# Patient Record
Sex: MALE | Race: WHITE | Employment: FULL TIME | URBAN - NONMETROPOLITAN AREA
[De-identification: names, ages, dates, MRNs, and addresses within clinical notes are randomized per-mention and may not be internally consistent; named-entity substitution may affect disease eponyms.]

---

## 2020-07-13 ENCOUNTER — OFFICE VISIT (OUTPATIENT)
Dept: FAMILY MEDICINE CLINIC | Age: 30
End: 2020-07-13
Payer: COMMERCIAL

## 2020-07-13 VITALS
WEIGHT: 303 LBS | HEIGHT: 72 IN | OXYGEN SATURATION: 98 % | BODY MASS INDEX: 41.04 KG/M2 | SYSTOLIC BLOOD PRESSURE: 132 MMHG | DIASTOLIC BLOOD PRESSURE: 78 MMHG | HEART RATE: 88 BPM | TEMPERATURE: 97.6 F

## 2020-07-13 PROCEDURE — G8417 CALC BMI ABV UP PARAM F/U: HCPCS | Performed by: FAMILY MEDICINE

## 2020-07-13 PROCEDURE — 99214 OFFICE O/P EST MOD 30 MIN: CPT

## 2020-07-13 PROCEDURE — 99203 OFFICE O/P NEW LOW 30 MIN: CPT

## 2020-07-13 PROCEDURE — G8427 DOCREV CUR MEDS BY ELIG CLIN: HCPCS | Performed by: FAMILY MEDICINE

## 2020-07-13 PROCEDURE — 4004F PT TOBACCO SCREEN RCVD TLK: CPT | Performed by: FAMILY MEDICINE

## 2020-07-13 PROCEDURE — 99204 OFFICE O/P NEW MOD 45 MIN: CPT | Performed by: FAMILY MEDICINE

## 2020-07-13 ASSESSMENT — PATIENT HEALTH QUESTIONNAIRE - PHQ9
1. LITTLE INTEREST OR PLEASURE IN DOING THINGS: 0
SUM OF ALL RESPONSES TO PHQ QUESTIONS 1-9: 0
2. FEELING DOWN, DEPRESSED OR HOPELESS: 0
SUM OF ALL RESPONSES TO PHQ QUESTIONS 1-9: 0
SUM OF ALL RESPONSES TO PHQ9 QUESTIONS 1 & 2: 0

## 2020-07-13 NOTE — LETTER
[facility] department of [place] [address]  Phone: [phone]  Fax: [phone]    Salvatore Vega MD        July 13, 2020     Patient: Lloyd Carrasco   YOB: 1990   Date of Visit: 7/13/2020       To Whom It May Concern: It is my medical opinion that Earl Reyes should remain out of work until 7/23/2020. please excuse due to illness from 7/1/2020-7/23/2020. .    If you have any questions or concerns, please don't hesitate to call.     Sincerely,        Salvatore Vega MD

## 2020-07-13 NOTE — PROGRESS NOTES
HPI:  Patient comes in today for   Chief Complaint   Patient presents with   Kassy Lewis New Doctor    Letter for School/Work     is scheduled to see surgeon on 7/23 [facility]   Here to get established. Recently was hospitalized at [address] for incarcerated abdominal hernia,was treated non surgically. h/o ventral hernia with recurrence,has had surgery in past for bowel obstruction and also has had abdominal wall hernia repair which has failed,is scheduled to go to [address] [place] [address] for ventral hernia repair. Is trying to loose weight. Needs off work note for his upcoming surgery and has had multiple recurrence of his hernia needing him to go the ER frequently this past month and  missing work. HISTORY:  No past medical history on file.     Past Surgical History:   Procedure Laterality Date    FRACTURE SURGERY Left 2008    tibia fibula surgery    OTHER SURGICAL HISTORY  01/27/2016    exploration of abdomen, repair of liver and bowel        Family History   Problem Relation Age of Onset    Diabetes Mother        Social History     Socioeconomic History    Marital status: Single     Spouse name: Not on file    Number of children: Not on file    Years of education: Not on file    Highest education level: Not on file   Occupational History    Not on file   Social Needs    Financial resource strain: Not on file    Food insecurity     Worry: Not on file     Inability: Not on file    Transportation needs     Medical: Not on file     Non-medical: Not on file   Tobacco Use    Smoking status: Former Smoker   Substance and Sexual Activity    Alcohol use: Not on file    Drug use: Not on file    Sexual activity: Not on file   Lifestyle    Physical activity     Days per week: Not on file     Minutes per session: Not on file    Stress: Not on file   Relationships    Social connections     Talks on phone: Not on file     Gets together: Not on file     Attends Gnosticism service: Not on file     Active member of club or organization: Not on file     Attends meetings of clubs or organizations: Not on file     Relationship status: Not on file    Intimate partner violence     Fear of current or ex partner: Not on file     Emotionally abused: Not on file     Physically abused: Not on file     Forced sexual activity: Not on file   Other Topics Concern    Not on file   Social History Narrative    Not on file       No current outpatient medications on file. No current facility-administered medications for this visit. Allergies   Allergen Reactions    Hydrocodone Nausea And Vomiting    Hydrocodone-Acetaminophen Nausea And Vomiting    Morphine Nausea And Vomiting and Other (See Comments)     Dizziness and states \"makes me mean\"       REVIEW OF SYSTEMS:  General: No fevers, chills, change in weight  HEENT: No double vision, blurry vision, runny nose, sore throat, tinnitus  Cardio: No chest pain, palpitations, JOY, edema, PND  Pulmonary: No cough, hemoptysis, SOB  GI: No nausea, vomiting, dysphagia, odynophagia, diarrhea, constipation. : No dysuria, hematuria, urgency, incontinence  Musculoskeletal: No muscle or joint aches, no joint swelling  Neuro: No dizziness/lightheadedness, no seizures  Endocrine: No polyuria, polydipsia, polyphagia, no temperature intolerance  Skin: No lesions or itching  No problems with ADLs  Sleep: good  Psychiatric: No depression or anxiety. PHYSICAL EXAM:  VS:  /78   Pulse 88   Temp 97.6 °F (36.4 °C)   Ht 6' (1.829 m)   Wt (!) 303 lb (137.4 kg)   SpO2 98%   BMI 41.09 kg/m²   General:  Alert and oriented, NAD  HEENT:  TMs, JUAN, EOMI, Conjunctivae clear       Throat currently clear.   NECK:  Supple without adenopathy or thyromegaly, no carotid bruits  LUNGS:  CTA all fields  HEART:  RRR without M, R, or G  ABDOMEN:  Soft and nontender without palpable abnormalities  EXTREMITIES:  Without clubbing, cyanosis, or edema, no calf tenderness  NEURO:  No focal deficits. SKIN:  warm to touch,normal texture. No active lesions. ASSESSMENT/PLAN:     Diagnosis Orders   1. Ventral hernia without obstruction or gangrene     2. Morbid obesity with body mass index (BMI) of 40.0 to 44.9 in adult [facility])         No orders of the defined types were placed in this encounter. Requested Prescriptions      No prescriptions requested or ordered in this encounter   F/u with [facility]. Off work till seen by surgery at [address] [place] [address]. Advised to continue with weight loss. Return if symptoms worsen or fail to improve.     Electronically signed by Maribel Allen MD

## 2023-01-10 ENCOUNTER — HOSPITAL ENCOUNTER (EMERGENCY)
Age: 33
Discharge: HOME | End: 2023-01-10
Attending: EMERGENCY MEDICINE
Payer: COMMERCIAL

## 2023-01-10 ENCOUNTER — APPOINTMENT (OUTPATIENT)
Dept: CT IMAGING | Age: 33
End: 2023-01-10
Payer: COMMERCIAL

## 2023-01-10 VITALS
WEIGHT: 305 LBS | BODY MASS INDEX: 41.31 KG/M2 | TEMPERATURE: 97.1 F | OXYGEN SATURATION: 95 % | DIASTOLIC BLOOD PRESSURE: 90 MMHG | RESPIRATION RATE: 14 BRPM | HEART RATE: 59 BPM | SYSTOLIC BLOOD PRESSURE: 134 MMHG | HEIGHT: 72 IN

## 2023-01-10 DIAGNOSIS — K43.9 VENTRAL HERNIA WITHOUT OBSTRUCTION OR GANGRENE: Primary | ICD-10-CM

## 2023-01-10 LAB
ABSOLUTE EOS #: 0.08 K/UL (ref 0–0.44)
ABSOLUTE IMMATURE GRANULOCYTE: 0.03 K/UL (ref 0–0.3)
ABSOLUTE LYMPH #: 3.57 K/UL (ref 1.1–3.7)
ABSOLUTE MONO #: 0.76 K/UL (ref 0.1–1.2)
ALBUMIN SERPL-MCNC: 4.5 G/DL (ref 3.5–5.2)
ALBUMIN/GLOBULIN RATIO: 1.6 (ref 1–2.5)
ALP BLD-CCNC: 80 U/L (ref 40–129)
ALT SERPL-CCNC: 22 U/L (ref 5–41)
AMYLASE: 52 U/L (ref 28–100)
ANION GAP SERPL CALCULATED.3IONS-SCNC: 11 MMOL/L (ref 9–17)
AST SERPL-CCNC: 18 U/L
BACTERIA: ABNORMAL
BASOPHILS # BLD: 0 % (ref 0–2)
BASOPHILS ABSOLUTE: 0.04 K/UL (ref 0–0.2)
BILIRUB SERPL-MCNC: 0.3 MG/DL (ref 0.3–1.2)
BILIRUBIN DIRECT: 0.1 MG/DL
BILIRUBIN URINE: NEGATIVE
BILIRUBIN, INDIRECT: 0.2 MG/DL (ref 0–1)
BUN BLDV-MCNC: 18 MG/DL (ref 6–20)
CALCIUM SERPL-MCNC: 9.5 MG/DL (ref 8.6–10.4)
CHLORIDE BLD-SCNC: 103 MMOL/L (ref 98–107)
CO2: 24 MMOL/L (ref 20–31)
CREAT SERPL-MCNC: 0.82 MG/DL (ref 0.7–1.2)
EOSINOPHILS RELATIVE PERCENT: 1 % (ref 1–4)
EPITHELIAL CELLS UA: ABNORMAL /HPF (ref 0–5)
GFR SERPL CREATININE-BSD FRML MDRD: >60 ML/MIN/1.73M2
GLUCOSE BLD-MCNC: 87 MG/DL (ref 70–99)
GLUCOSE URINE: NEGATIVE
HCT VFR BLD CALC: 45.8 % (ref 40.7–50.3)
HEMOGLOBIN: 15.6 G/DL (ref 13–17)
IMMATURE GRANULOCYTES: 0 %
KETONES, URINE: NEGATIVE
LACTIC ACID: 1.3 MMOL/L (ref 0.5–2.2)
LEUKOCYTE ESTERASE, URINE: NEGATIVE
LIPASE: 18 U/L (ref 13–60)
LYMPHOCYTES # BLD: 34 % (ref 24–43)
MCH RBC QN AUTO: 29.7 PG (ref 25.2–33.5)
MCHC RBC AUTO-ENTMCNC: 34.1 G/DL (ref 25.2–33.5)
MCV RBC AUTO: 87.1 FL (ref 82.6–102.9)
MONOCYTES # BLD: 7 % (ref 3–12)
MUCUS: ABNORMAL
NITRITE, URINE: NEGATIVE
NRBC AUTOMATED: 0 PER 100 WBC
PDW BLD-RTO: 12.1 % (ref 11.8–14.4)
PH UA: 5.5 (ref 5–6)
PLATELET # BLD: 255 K/UL (ref 138–453)
PMV BLD AUTO: 10 FL (ref 8.1–13.5)
POTASSIUM SERPL-SCNC: 4.2 MMOL/L (ref 3.7–5.3)
PROTEIN UA: NEGATIVE
RBC # BLD: 5.26 M/UL (ref 4.21–5.77)
RBC UA: ABNORMAL /HPF (ref 0–4)
SEG NEUTROPHILS: 58 % (ref 36–65)
SEGMENTED NEUTROPHILS ABSOLUTE COUNT: 6.07 K/UL (ref 1.5–8.1)
SODIUM BLD-SCNC: 138 MMOL/L (ref 135–144)
SPECIFIC GRAVITY UA: 1.03 (ref 1.01–1.02)
TOTAL PROTEIN: 7.3 G/DL (ref 6.4–8.3)
URINE HGB: NEGATIVE
UROBILINOGEN, URINE: NORMAL
WBC # BLD: 10.6 K/UL (ref 3.5–11.3)
WBC UA: ABNORMAL /HPF (ref 0–4)

## 2023-01-10 PROCEDURE — 80053 COMPREHEN METABOLIC PANEL: CPT

## 2023-01-10 PROCEDURE — 6360000002 HC RX W HCPCS: Performed by: EMERGENCY MEDICINE

## 2023-01-10 PROCEDURE — 85025 COMPLETE CBC W/AUTO DIFF WBC: CPT

## 2023-01-10 PROCEDURE — 96374 THER/PROPH/DIAG INJ IV PUSH: CPT

## 2023-01-10 PROCEDURE — 83690 ASSAY OF LIPASE: CPT

## 2023-01-10 PROCEDURE — 82248 BILIRUBIN DIRECT: CPT

## 2023-01-10 PROCEDURE — 83605 ASSAY OF LACTIC ACID: CPT

## 2023-01-10 PROCEDURE — 6360000004 HC RX CONTRAST MEDICATION: Performed by: EMERGENCY MEDICINE

## 2023-01-10 PROCEDURE — 96361 HYDRATE IV INFUSION ADD-ON: CPT

## 2023-01-10 PROCEDURE — 99285 EMERGENCY DEPT VISIT HI MDM: CPT

## 2023-01-10 PROCEDURE — 74177 CT ABD & PELVIS W/CONTRAST: CPT

## 2023-01-10 PROCEDURE — 82150 ASSAY OF AMYLASE: CPT

## 2023-01-10 PROCEDURE — 96375 TX/PRO/DX INJ NEW DRUG ADDON: CPT

## 2023-01-10 PROCEDURE — 2580000003 HC RX 258: Performed by: EMERGENCY MEDICINE

## 2023-01-10 PROCEDURE — 81001 URINALYSIS AUTO W/SCOPE: CPT

## 2023-01-10 RX ORDER — ONDANSETRON 4 MG/1
4 TABLET, FILM COATED ORAL EVERY 8 HOURS PRN
Qty: 20 TABLET | Refills: 0 | Status: SHIPPED | OUTPATIENT
Start: 2023-01-10

## 2023-01-10 RX ORDER — ONDANSETRON 2 MG/ML
4 INJECTION INTRAMUSCULAR; INTRAVENOUS ONCE
Status: COMPLETED | OUTPATIENT
Start: 2023-01-10 | End: 2023-01-10

## 2023-01-10 RX ORDER — KETOROLAC TROMETHAMINE 30 MG/ML
30 INJECTION, SOLUTION INTRAMUSCULAR; INTRAVENOUS ONCE
Status: COMPLETED | OUTPATIENT
Start: 2023-01-10 | End: 2023-01-10

## 2023-01-10 RX ORDER — 0.9 % SODIUM CHLORIDE 0.9 %
1000 INTRAVENOUS SOLUTION INTRAVENOUS ONCE
Status: COMPLETED | OUTPATIENT
Start: 2023-01-10 | End: 2023-01-10

## 2023-01-10 RX ORDER — IBUPROFEN 800 MG/1
800 TABLET ORAL EVERY 8 HOURS PRN
Qty: 30 TABLET | Refills: 0 | Status: SHIPPED | OUTPATIENT
Start: 2023-01-10

## 2023-01-10 RX ADMIN — KETOROLAC TROMETHAMINE 30 MG: 30 INJECTION, SOLUTION INTRAMUSCULAR at 18:19

## 2023-01-10 RX ADMIN — IOPAMIDOL 100 ML: 755 INJECTION, SOLUTION INTRAVENOUS at 19:06

## 2023-01-10 RX ADMIN — SODIUM CHLORIDE 1000 ML: 9 INJECTION, SOLUTION INTRAVENOUS at 18:20

## 2023-01-10 RX ADMIN — ONDANSETRON 4 MG: 2 INJECTION INTRAMUSCULAR; INTRAVENOUS at 18:18

## 2023-01-10 ASSESSMENT — ENCOUNTER SYMPTOMS
ABDOMINAL PAIN: 1
CONSTIPATION: 0
SORE THROAT: 0
BACK PAIN: 0
DIARRHEA: 0
BLOOD IN STOOL: 0
VOMITING: 0
SHORTNESS OF BREATH: 0
WHEEZING: 0
TROUBLE SWALLOWING: 0
NAUSEA: 1

## 2023-01-10 ASSESSMENT — PAIN SCALES - GENERAL
PAINLEVEL_OUTOF10: 4
PAINLEVEL_OUTOF10: 9
PAINLEVEL_OUTOF10: 4
PAINLEVEL_OUTOF10: 9

## 2023-01-10 ASSESSMENT — PAIN - FUNCTIONAL ASSESSMENT
PAIN_FUNCTIONAL_ASSESSMENT: 0-10
PAIN_FUNCTIONAL_ASSESSMENT: 0-10

## 2023-01-10 ASSESSMENT — PAIN DESCRIPTION - FREQUENCY: FREQUENCY: CONTINUOUS

## 2023-01-10 ASSESSMENT — PAIN DESCRIPTION - ORIENTATION: ORIENTATION: UPPER

## 2023-01-10 ASSESSMENT — PAIN DESCRIPTION - LOCATION: LOCATION: ABDOMEN

## 2023-01-10 ASSESSMENT — PAIN DESCRIPTION - PAIN TYPE: TYPE: ACUTE PAIN
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
## 2023-01-10 NOTE — Clinical Note
Kylie Garber was seen and treated in our emergency department on 1/10/2023. He may return to work on 01/12/2023. If you have any questions or concerns, please don't hesitate to call.       Nadine Frey MD

## 2023-01-11 NOTE — PROGRESS NOTES
Talked to the patient and informed him of concerns. Patient said he will get ahold of Dr. Shmuel Sinha office.